# Patient Record
Sex: MALE | Race: OTHER | NOT HISPANIC OR LATINO
[De-identification: names, ages, dates, MRNs, and addresses within clinical notes are randomized per-mention and may not be internally consistent; named-entity substitution may affect disease eponyms.]

---

## 2017-03-09 ENCOUNTER — APPOINTMENT (OUTPATIENT)
Dept: DERMATOLOGY | Facility: CLINIC | Age: 22
End: 2017-03-09

## 2017-04-11 ENCOUNTER — APPOINTMENT (OUTPATIENT)
Dept: DERMATOLOGY | Facility: CLINIC | Age: 22
End: 2017-04-11

## 2017-04-11 VITALS
SYSTOLIC BLOOD PRESSURE: 100 MMHG | HEIGHT: 74 IN | WEIGHT: 168 LBS | BODY MASS INDEX: 21.56 KG/M2 | DIASTOLIC BLOOD PRESSURE: 80 MMHG

## 2017-04-12 LAB
ALBUMIN SERPL ELPH-MCNC: 4.5 G/DL
ALP BLD-CCNC: 73 U/L
ALT SERPL-CCNC: 17 U/L
ANION GAP SERPL CALC-SCNC: 15 MMOL/L
AST SERPL-CCNC: 25 U/L
BILIRUB SERPL-MCNC: 0.4 MG/DL
BUN SERPL-MCNC: 14 MG/DL
CALCIUM SERPL-MCNC: 9.6 MG/DL
CHLORIDE SERPL-SCNC: 102 MMOL/L
CO2 SERPL-SCNC: 23 MMOL/L
CREAT SERPL-MCNC: 1.03 MG/DL
GLUCOSE SERPL-MCNC: 74 MG/DL
POTASSIUM SERPL-SCNC: 4.2 MMOL/L
PROT SERPL-MCNC: 7.8 G/DL
SODIUM SERPL-SCNC: 140 MMOL/L

## 2017-04-13 LAB
BASOPHILS # BLD AUTO: 0.01 K/UL
BASOPHILS NFR BLD AUTO: 0.2 %
CHOLEST SERPL-MCNC: 123 MG/DL
CHOLEST/HDLC SERPL: 2.5 RATIO
EOSINOPHIL # BLD AUTO: 0.12 K/UL
EOSINOPHIL NFR BLD AUTO: 1.8 %
HCT VFR BLD CALC: 47.5 %
HDLC SERPL-MCNC: 49 MG/DL
HGB BLD-MCNC: 16.3 G/DL
IMM GRANULOCYTES NFR BLD AUTO: 0.2 %
LDLC SERPL CALC-MCNC: 56 MG/DL
LYMPHOCYTES # BLD AUTO: 2.45 K/UL
LYMPHOCYTES NFR BLD AUTO: 36.8 %
MAN DIFF?: NORMAL
MCHC RBC-ENTMCNC: 31.4 PG
MCHC RBC-ENTMCNC: 34.3 GM/DL
MCV RBC AUTO: 91.5 FL
MONOCYTES # BLD AUTO: 0.67 K/UL
MONOCYTES NFR BLD AUTO: 10.1 %
NEUTROPHILS # BLD AUTO: 3.39 K/UL
NEUTROPHILS NFR BLD AUTO: 50.9 %
PLATELET # BLD AUTO: 196 K/UL
RBC # BLD: 5.19 M/UL
RBC # FLD: 13.3 %
TRIGL SERPL-MCNC: 88 MG/DL
WBC # FLD AUTO: 6.65 K/UL

## 2017-05-11 ENCOUNTER — APPOINTMENT (OUTPATIENT)
Dept: DERMATOLOGY | Facility: CLINIC | Age: 22
End: 2017-05-11

## 2017-07-14 ENCOUNTER — APPOINTMENT (OUTPATIENT)
Dept: DERMATOLOGY | Facility: CLINIC | Age: 22
End: 2017-07-14

## 2017-07-14 VITALS — DIASTOLIC BLOOD PRESSURE: 80 MMHG | SYSTOLIC BLOOD PRESSURE: 110 MMHG

## 2017-07-14 RX ORDER — FLUOCINONIDE 0.5 MG/ML
0.05 SOLUTION TOPICAL
Qty: 1 | Refills: 0 | Status: ACTIVE | COMMUNITY
Start: 2017-07-14 | End: 1900-01-01

## 2017-07-14 RX ORDER — CLINDAMYCIN PHOSPHATE 10 MG/ML
1 LOTION TOPICAL
Qty: 120 | Refills: 3 | Status: COMPLETED | COMMUNITY
Start: 2017-04-11 | End: 2017-07-14

## 2017-10-13 ENCOUNTER — APPOINTMENT (OUTPATIENT)
Dept: DERMATOLOGY | Facility: CLINIC | Age: 22
End: 2017-10-13

## 2017-11-17 ENCOUNTER — APPOINTMENT (OUTPATIENT)
Dept: DERMATOLOGY | Facility: CLINIC | Age: 22
End: 2017-11-17
Payer: COMMERCIAL

## 2017-11-17 VITALS — SYSTOLIC BLOOD PRESSURE: 110 MMHG | DIASTOLIC BLOOD PRESSURE: 68 MMHG

## 2017-11-17 DIAGNOSIS — L72.0 EPIDERMAL CYST: ICD-10-CM

## 2017-11-17 DIAGNOSIS — L70.9 ACNE, UNSPECIFIED: ICD-10-CM

## 2017-11-17 DIAGNOSIS — L21.9 SEBORRHEIC DERMATITIS, UNSPECIFIED: ICD-10-CM

## 2017-11-17 PROCEDURE — 99214 OFFICE O/P EST MOD 30 MIN: CPT

## 2017-11-17 RX ORDER — KETOCONAZOLE 20.5 MG/ML
2 SHAMPOO, SUSPENSION TOPICAL
Qty: 1 | Refills: 5 | Status: ACTIVE | COMMUNITY
Start: 2017-07-14 | End: 1900-01-01

## 2017-11-17 RX ORDER — CLINDAMYCIN PHOSPHATE 10 MG/ML
1 LOTION TOPICAL TWICE DAILY
Qty: 1 | Refills: 3 | Status: ACTIVE | COMMUNITY
Start: 2017-07-14 | End: 1900-01-01

## 2018-02-23 ENCOUNTER — APPOINTMENT (OUTPATIENT)
Dept: DERMATOLOGY | Facility: CLINIC | Age: 23
End: 2018-02-23

## 2018-05-25 ENCOUNTER — APPOINTMENT (OUTPATIENT)
Dept: DERMATOLOGY | Facility: CLINIC | Age: 23
End: 2018-05-25

## 2024-02-06 ENCOUNTER — EMERGENCY (EMERGENCY)
Facility: HOSPITAL | Age: 29
LOS: 1 days | Discharge: HOME | End: 2024-02-06
Attending: EMERGENCY MEDICINE | Admitting: EMERGENCY MEDICINE
Payer: COMMERCIAL

## 2024-02-06 VITALS
OXYGEN SATURATION: 97 % | TEMPERATURE: 98 F | DIASTOLIC BLOOD PRESSURE: 76 MMHG | RESPIRATION RATE: 18 BRPM | HEART RATE: 71 BPM | SYSTOLIC BLOOD PRESSURE: 116 MMHG

## 2024-02-06 PROCEDURE — 99283 EMERGENCY DEPT VISIT LOW MDM: CPT

## 2024-02-06 PROCEDURE — 90792 PSYCH DIAG EVAL W/MED SRVCS: CPT

## 2024-02-06 NOTE — ED BEHAVIORAL HEALTH ASSESSMENT NOTE - OTHER
work related stressors; family relationship issues [facility] Reference #: [id number] - no controlled substances prescribed mother

## 2024-02-06 NOTE — ED PROVIDER NOTE - PATIENT PORTAL LINK FT
You can access the FollowMyHealth Patient Portal offered by [facility] by registering at the following website: [email or web address]. By joining [facility]’s FollowMyHealth portal, you will also be able to view your health information using other applications (apps) compatible with our system.

## 2024-02-06 NOTE — ED BEHAVIORAL HEALTH ASSESSMENT NOTE - NSBHROSSYSTEMS_PSY_ALL_CORE
Pt currently denies experiencing any headaches; has no dizziness, no blurring of vision; no cough/ colds, no sore throat; no fever. not complaining of any chest pains, no SOB, no palpitations; no abdominal/ flank pains, no nausea/ vomiting or diarrhea/ constipation; no dysuria, no hesitancy, no polyuria, no hematuria; no pruritus/ no rashes nor any edema. denied any muscle/ joint pains

## 2024-02-06 NOTE — ED BEHAVIORAL HEALTH ASSESSMENT NOTE - DESCRIPTION
Since Pt's  ED arrival, Pt has been calm and cooperative.  There has been no occurrence of agitation/aggressive behavior.  No verbalization of active/ passive SI/HI.   There are no signs/symptoms of severe MDD/ not psychomotorically retarded, not severely anxious, not acutely manic or floridly psychotic. Pt is not showing any signs/symptoms of intoxication or withdrawal.  Pt is not delirious.. has not tested limits.. Has maintained appropriate boundaries. Pt has been easily redirected.  NO PRN meds given.  Overall, there has been no management issues.      Vital Signs Last 24 Hrs  T(C): 36.5 (06 Feb 2024 17:48), Max: 36.5 (06 Feb 2024 17:48)  T(F): 97.7 (06 Feb 2024 17:48), Max: 97.7 (06 Feb 2024 17:48)  HR: 71 (06 Feb 2024 17:48) (71 - 71)  BP: 116/76 (06 Feb 2024 17:48) (116/76 - 116/76)  BP(mean): --  RR: 18 (06 Feb 2024 17:48) (18 - 18)  SpO2: 97% (06 Feb 2024 17:48) (97% - 97%)    Parameters below as of 06 Feb 2024 17:48  Patient On (Oxygen Delivery Method): room air single and no children; 1 brother (close with him). mother (who is RN from [employer]) lives nearby with Pt. likes going to the gym; walking; music; basketball. is Jewish. no pets. no reported access to guns NONE  reported

## 2024-02-06 NOTE — ED BEHAVIORAL HEALTH ASSESSMENT NOTE - SUMMARY
28/M with no psych hx; no reported hx of psych admissions; denied any SA and no illicit substance use apart from admitting binging alcohol. no reported pertinent medical issues. tonight, presented to the ED accompanied by mother due to concern for paranoia/ referential ideations    at this time, he endorses feeling overwhelmed and anxious due to ongoing psychosocial stressors. there are times that said feeling of being overwhelmed translates to sadness + anxiety. however, nothing suggesting that said symptoms escalate - per Pt. neither does these symptoms meet severe MDD nor any severe specific anxiety disorder criteria.      mother expressed concern re: Pt's evolving paranoia/ referential ideations. otherwise, nothing suggestive that Pt is manifesting any worsening florid psychotic symptoms.  at this time, the Pt is not exhibiting any signs/ symptoms suggestive of florid psychosis.  he is not acutely manic. currently, not harboring any passive or active SI nor HI.  whilst admitted having binge alcohol drinking, at this time, does not appear to be acutely intoxicated nor in any impending withdrawal.  Pt at this time, does not meet criteria for involuntary psych admission. he was however, offered voluntary admission (for the purpose of psych meds initiation + therapy) but he declined.      RECOMMENDATIONS:   1. Psychoeducation provided.  Encouraged follow up with OP psych services.  No indication for emergent psych meds at this time. Discussed role of psychotherapy.  Pt expressed that he is open to this once he is able to establish an out Pt psychiatry clinic follow up.  lastly, discussed impact of continued alcohol use on health and well being as well as the importance of sobriety.  Pt declined referral to substance use program   2. Emergency protocol reviewed.  Pt and mother were adviced to call 911 or come to the nearest ED should symptoms worsen; have increasing bouts of agitation/aggressive behavior; having SI/HI; or call [phone]    3. given resources to the community like [facility], other OP psych clinics within Pt's community: [employer] and [employer]  4. no psych meds prescribed

## 2024-02-06 NOTE — ED BEHAVIORAL HEALTH ASSESSMENT NOTE - DIFFERENTIAL
adjustment disorder   primary psychotic disorder (as per mother, has paranoia x 1-2 months now; referential ideations including; otherwise, no perceptual disturbances) vs MDD with psychosis (as per mother, Pt has been depressed)  rule out substance induced mood/ psychotic disorder

## 2024-02-06 NOTE — ED BEHAVIORAL HEALTH ASSESSMENT NOTE - RISK ASSESSMENT
Risk Assessment:  Modifiable risk factors: paranoia, anxiety  Unmodifiable risk factors: young male, not in psych care, with hx of alcohol use  Protective factors: currently NO ACTIVE OBJECTIVE findings of acute suicidality, no reported hx of SA nor any NSSIB, no family hx of SA, Pt's sense of responsibility to family, positive therapeutic relationship with family, social supports, no reported access to guns, no complex medical issues nor any chronic pains, Sikhism, currently not severely depressed, is not acutely manic, not psychotic, not intoxicated or in withdrawal, no pending legal issues, Sikhism    Given above, the Pt is currently NOT in acute imminent risk of self harm as well as also not being at chronically elevated risk of self-harm.  currently, there is no identifiable acute increase in risk that   would be mitigated by an involuntary psychiatric admission. Pt refused voluntary admission to in-Pt unit

## 2024-02-06 NOTE — ED BEHAVIORAL HEALTH ASSESSMENT NOTE - HPI (INCLUDE ILLNESS QUALITY, SEVERITY, DURATION, TIMING, CONTEXT, MODIFYING FACTORS, ASSOCIATED SIGNS AND SYMPTOMS)
28 yr old male, single, domiciled alone and employed (working from home). no established past psych hx; no reported hx of psych admissions; denied any SA and no illicit substance use apart from admitting binging alcohol (last drink: over the weekend). denied any withdrawals/ DTs. no reported pertinent medical issues. tonight, presented to the ED accompanied by mother due to concern for paranoia/ referential ideations    seen along the hallway. DID NOT APPEAR TO BE ACTIVELY INTERNALLY STIMULATED; NOT ENGAGING IN ANY STEREOTYPICAL BEHAVIOR.. able to engage towards meaningful psych eval. reported that overall, has been doing well sans episodes of feeling sad and anxious as precipitated and perpetuated by life stressors, namely: work related stressors and at times, conflicts within his family.. otherwise, denied any other major life stressors    no reported signs/ symptoms suggestive of severe MDD; denied any changes to his sleeping pattern or eating habits. is not feeling hopeless/ helpless/ worthless. denied harboring any passive or active SI or HI.  no reported worsening of the depression nor anxiety. whilst feeling anxious at times, he denied experiencing any specific anxiety disorder symptoms. no signs/ symptoms suggestive of luana (denied grandiosity/ racing thoughts/ increased goal directed activities or engaged in risk taking behavior/ no pressured speech/ no elevated mood/ denied any increased in energy level causing sleep disruption).  is not feeling paranoid/ no referential ideations. denied any perceptual disturbances. no thought insertion/ withdrawal nor broadcasting    today, claimed that he felt overwhelmed with "things" (stressors from work). claims that he told mother how he was feeling.. she "misconstrued" this and felt that he needed to come to the ED to get a psych evaluation. he agreed. he does not feel the need for a psych in Pt admission.     collateral per mother: Pt has no psych hx; never been violent; no passive or active SI or HI.. claims that Pt has been paranoid x 1-2 months now. reported that Pt has installed camera in his apartment. upset that he feels that mother is talking about him, divulging things in his life with her friends and family.. mother added that Pt has been anxious.. told mother that "I cannot function"; I have a lot of things going on". otherwise, no perceptual disturbances; no thought insertion/ broadcasting/ withdrawal. no bizarre behaviors noted.   no compromised with Pt's ADLs reported. no emergent safety concerns raised

## 2024-02-06 NOTE — ED PROVIDER NOTE - CLINICAL SUMMARY MEDICAL DECISION MAKING FREE TEXT BOX
Patient presents emergency department worsening paranoia polysubstance and intermittent episodes of insomnia.  Patient required requiring evaluation by psych cleared patient provided resources for outpatient follow-up with crisis and also provide stable no signs of wrist to self or others no auditory visualizations states he is going to start cutting back on his drug use.  Patient otherwise well-appearing stable.

## 2024-02-06 NOTE — ED BEHAVIORAL HEALTH ASSESSMENT NOTE - REFERRAL / APPOINTMENT DETAILS
provided with the following: [facility] Crisis centre, other OP psych clinics within Pt's community: [employer] and [employer]

## 2024-02-06 NOTE — ED PROVIDER NOTE - NSFOLLOWUPINSTRUCTIONS_ED_ALL_ED_FT
Paranoid Personality Disorder  Paranoid personality disorder is a mental health disorder in which a person has a strong distrust of other people. People with this disorder may be constantly suspicious that other people are against them or want to harm them. Although many people have these feelings sometimes, people with this disorder have them almost all the time.    If you have this condition, these feelings and beliefs are severe enough to cause problems in your personal life and relationships. Long-term treatment may help you manage your condition.    What are the causes?  The exact cause of this condition is not known. The following factors may have a role in causing this disorder:  A problem with brain chemistry.  Certain genes that are passed from parent to child (inherited).  Emotional or physical abuse during childhood.  Being bullied during childhood.  What increases the risk?  You are more likely to develop this condition if you are male.    What are the signs or symptoms?  Symptoms of this condition are beliefs that only you have. Others may disagree with you about these feelings and beliefs. These symptoms are:  A strong and false suspicion that other people are taking advantage of you, hurting you, or tricking you.  Being worried that people you know are not reliable or trustworthy.  Being fearful of sharing information and thoughts with others because you think those may be used against you.  Believing that comments made by others have hidden, hostile, or threatening meanings.  Strongly holding grudges against people for what you believe are good reasons.  Being very defensive, angry, and quick to strike back at anyone you think has disrespected you or wronged you.  Believing that your spouse or partner has been unfaithful.  Paranoid personality disorder is a lifelong disorder. It may begin in childhood or the teen years, and it may be worst when a person is around 20 to 25 years old.    How is this diagnosed?  To diagnose this disorder, a mental health care provider may:  Ask you questions about your life and your beliefs.  Talk to people in your life about your beliefs and behaviors.  Have you take a personality test.  Test you for drug or alcohol use.  Look for symptoms of other mental health disorders.  How is this treated?  Two people talking with a mental health care provider.  This condition may be treated with:  Medicines. You may be given medicine to help relieve symptoms such as depression, anxiety, or mood swings.  Talk therapy (psychotherapy). Psychotherapy may include:  Cognitive behavioral therapy (CBT). This type of talk therapy can help you learn how to make choices about what you feel and believe. It is important to trust your mental health care provider and follow his or her recommendations.  Psychodynamic therapy. This therapy helps you learn how to trust and interact with others better.  Follow these instructions at home:  Lifestyle    Do not use drugs.  Do not drink alcohol.  Find ways to reduce stress, such as doing meditation or yoga.  Stay active and try to exercise every day. Exercise may reduce stress.  General instructions    Take over-the-counter and prescription medicines only as told by your health care provider.  Learn as much as you can about your disorder so that you have an active role in your treatment.  Work with your mental health care provider to build trust and make a treatment plan that works for you.  Keep all follow-up visits. This is important.  Contact a health care provider if:  You are struggling to manage your condition.  You have feelings of depression or anxiety.  You are abusing drugs or alcohol.  Get help right away if:  You have thoughts about hurting yourself or someone else.  Get help right away if you feel like you may hurt yourself or others, or have thoughts about taking your own life. Go to your nearest emergency room or:  Call 911.  Call the National Suicide Prevention Lifeline at 1-781.757.9322 or 102. This is open 24 hours a day.  Text the Crisis Text Line at 688749.  Summary  Paranoid personality disorder is a mental health disorder in which a person has a strong distrust of other people.  Paranoid personality disorder is a lifelong condition that requires long-term treatment.  This condition may be treated with psychotherapy and medicines.  Stay active and try to exercise every day.  This information is not intended to replace advice given to you by your health care provider. Make sure you discuss any questions you have with your health care provider.

## 2024-02-06 NOTE — ED BEHAVIORAL HEALTH ASSESSMENT NOTE - DETAILS
NONE REPORTED IS NOT SUICIDAL mother informed. discussed reccs with ED attending FATHER -  in  due to myocardial infarction; maternal grandmother - dementia; denied any hx of SA

## 2024-02-06 NOTE — ED BEHAVIORAL HEALTH ASSESSMENT NOTE - NSSUICPROTFACT_PSY_ALL_CORE
Responsibility to children, family, or others/Identifies reasons for living/Supportive social network of family or friends/Fear of death or the actual act of killing self/Cultural, spiritual and/or moral attitudes against suicide/Engaged in work or school/Positive therapeutic relationships/Church beliefs

## 2024-02-06 NOTE — ED ADULT NURSE NOTE - NSFALLUNIVINTERV_ED_ALL_ED
Bed/Stretcher in lowest position, wheels locked, appropriate side rails in place/Call bell, personal items and telephone in reach/Instruct patient to call for assistance before getting out of bed/chair/stretcher/Non-slip footwear applied when patient is off stretcher/[place] to call system/Physically safe environment - no spills, clutter or unnecessary equipment/Purposeful proactive rounding/Room/bathroom lighting operational, light cord in reach

## 2024-02-06 NOTE — ED BEHAVIORAL HEALTH ASSESSMENT NOTE - NSBHSACONSEQUENCE_PSY_A_CORE FT
admits binging but denied any black outs/ no DTs/ no withdrawal symptoms; denied any detox/ rehabs. no DUI/ no DWI

## 2024-02-06 NOTE — ED PROVIDER NOTE - OBJECTIVE STATEMENT
20-year-old male no past medical history presents emerged from multiple months of paranoia.Patient admits to intermittent use of cannabis with intermittent use of cocaine last time used cocaine was in January.  Patient here states he has had intermittent nights of insomnia.  Also states he does not want outside due to worsening paranoia.  Otherwise denies any SI HI  VH.  Cool complected in triage.

## 2024-02-06 NOTE — ED ADULT NURSE NOTE - OBJECTIVE STATEMENT
Received patient to  area, calm and cooperative, after he was forced by his mother to come in for paranoia. Patient evaluated by medicine and waiting for psych evaluation. Will continue to monitor. JOSE Sharma

## 2024-02-06 NOTE — ED PROVIDER NOTE - PSYCHIATRIC, MLM
Alert and oriented to person, place, time/situation. pressured speech mood and affect. no apparent risk to self or others.

## 2024-02-07 NOTE — ED BEHAVIORAL HEALTH NOTE - BEHAVIORAL HEALTH NOTE
HIGH RISK LOG:  writer called [phone] patient's mother states to call patient's number directly.  Writer called  left voicemail.
